# Patient Record
Sex: MALE | Race: WHITE | Employment: OTHER | URBAN - METROPOLITAN AREA
[De-identification: names, ages, dates, MRNs, and addresses within clinical notes are randomized per-mention and may not be internally consistent; named-entity substitution may affect disease eponyms.]

---

## 2017-01-03 ENCOUNTER — OFFICE VISIT (OUTPATIENT)
Dept: FAMILY MEDICINE CLINIC | Facility: CLINIC | Age: 62
End: 2017-01-03

## 2017-01-03 VITALS
BODY MASS INDEX: 29.35 KG/M2 | WEIGHT: 205 LBS | TEMPERATURE: 98 F | SYSTOLIC BLOOD PRESSURE: 120 MMHG | RESPIRATION RATE: 18 BRPM | HEIGHT: 70 IN | OXYGEN SATURATION: 98 % | DIASTOLIC BLOOD PRESSURE: 82 MMHG | HEART RATE: 78 BPM

## 2017-01-03 DIAGNOSIS — Z48.02 VISIT FOR SUTURE REMOVAL: Primary | ICD-10-CM

## 2017-01-03 NOTE — PROGRESS NOTES
Patient returns today to remove the remaining 9 sutures from his right lateral dorsal foot. He denies any fever, swelling, erythema, drainage, streaking redness or lymph swelling. PROCEDURE: suture removal.  LOCATION:right lateral dorsal foot.   WOUND EX

## 2017-01-03 NOTE — PATIENT INSTRUCTIONS
Suture or Staple Removal  You were seen today for a suture or staple removal. Your wound is healing as expected. The wound has healed well enough that the sutures or staples can be removed. The wound will continue to heal for the next few months.   At [facility]

## 2019-11-13 ENCOUNTER — ORDER TRANSCRIPTION (OUTPATIENT)
Dept: ADMINISTRATIVE | Facility: HOSPITAL | Age: 64
End: 2019-11-13

## 2019-11-13 DIAGNOSIS — G56.82 SUPRASCAPULAR MONONEUROPATHY, LEFT: Primary | ICD-10-CM

## 2019-11-13 DIAGNOSIS — M62.81 MUSCLE WEAKNESS (GENERALIZED): ICD-10-CM

## 2019-11-29 ENCOUNTER — TELEPHONE (OUTPATIENT)
Dept: NEUROLOGY | Facility: CLINIC | Age: 64
End: 2019-11-29

## 2019-11-29 NOTE — TELEPHONE ENCOUNTER
LMTCB to r/s EMG from Dr. Cecy Meza on 12/12/19 to Dr. Miguel Lucas in [facility].     Left arm - schedule 40 minutes

## 2019-12-02 ENCOUNTER — OFFICE VISIT (OUTPATIENT)
Dept: NEUROLOGY | Facility: CLINIC | Age: 64
End: 2019-12-02
Payer: MEDICARE

## 2019-12-02 VITALS
HEIGHT: 70 IN | WEIGHT: 205 LBS | SYSTOLIC BLOOD PRESSURE: 126 MMHG | DIASTOLIC BLOOD PRESSURE: 78 MMHG | HEART RATE: 74 BPM | BODY MASS INDEX: 29.35 KG/M2 | RESPIRATION RATE: 16 BRPM

## 2019-12-02 DIAGNOSIS — G54.0 BRACHIAL PLEXUS DYSFUNCTION: Primary | ICD-10-CM

## 2019-12-02 DIAGNOSIS — M54.12 CERVICAL RADICULOPATHY AT C8: ICD-10-CM

## 2019-12-02 PROCEDURE — 99215 OFFICE O/P EST HI 40 MIN: CPT | Performed by: OTHER

## 2019-12-02 RX ORDER — IPRATROPIUM BROMIDE 42 UG/1
2 SPRAY, METERED NASAL 4 TIMES DAILY
COMMUNITY

## 2019-12-02 NOTE — PROGRESS NOTES
[facility] with [address] [place] [address]  9/25/1955  Primary Care Provider:  Pete ePoples    12/2/2019  Accompanied visit:      (x) No.        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

## 2019-12-03 ENCOUNTER — APPOINTMENT (OUTPATIENT)
Dept: LAB | Age: 64
End: 2019-12-03
Attending: Other
Payer: MEDICARE

## 2019-12-03 DIAGNOSIS — G54.0 BRACHIAL PLEXUS DYSFUNCTION: ICD-10-CM

## 2019-12-03 PROCEDURE — 86140 C-REACTIVE PROTEIN: CPT

## 2019-12-03 PROCEDURE — 82550 ASSAY OF CK (CPK): CPT

## 2019-12-03 PROCEDURE — 86431 RHEUMATOID FACTOR QUANT: CPT

## 2019-12-03 PROCEDURE — 86038 ANTINUCLEAR ANTIBODIES: CPT

## 2019-12-03 PROCEDURE — 85652 RBC SED RATE AUTOMATED: CPT

## 2019-12-03 PROCEDURE — 36415 COLL VENOUS BLD VENIPUNCTURE: CPT

## 2019-12-06 ENCOUNTER — PROCEDURE VISIT (OUTPATIENT)
Dept: NEUROLOGY | Facility: CLINIC | Age: 64
End: 2019-12-06
Payer: MEDICARE

## 2019-12-06 DIAGNOSIS — R29.898 WEAKNESS OF BOTH HANDS: ICD-10-CM

## 2019-12-06 DIAGNOSIS — G54.0 BRACHIAL PLEXUS DISORDERS: Primary | ICD-10-CM

## 2019-12-06 PROCEDURE — 95885 MUSC TST DONE W/NERV TST LIM: CPT | Performed by: OTHER

## 2019-12-06 PROCEDURE — 95912 NRV CNDJ TEST 11-12 STUDIES: CPT | Performed by: OTHER

## 2019-12-09 NOTE — PROCEDURES
[facility] with [address]  [phone]    Fax  [phone]    Electrophysiologic Consultation      Patient: Severa Ahle      12/6/2019 Ankle AH 4.55 3.2 100 Ankle - AH 8       Knee AH 13.45 1.8 55.8 Knee - Ankle 41 46.1                 Sensory NCS      Nerve / Sites Rec.  Site Onset Lat Peak Lat NP Amp PP Amp Segments Distance Velocity     ms ms µV µV  cm m/s   R SURAL - Ortho      Lat

## 2019-12-13 ENCOUNTER — OFFICE VISIT (OUTPATIENT)
Dept: NEUROLOGY | Facility: CLINIC | Age: 64
End: 2019-12-13
Payer: MEDICARE

## 2019-12-13 VITALS — HEIGHT: 70 IN | RESPIRATION RATE: 16 BRPM | BODY MASS INDEX: 29.35 KG/M2 | WEIGHT: 205 LBS

## 2019-12-13 DIAGNOSIS — G54.0 BRACHIAL PLEXUS DISORDERS: ICD-10-CM

## 2019-12-13 DIAGNOSIS — M79.18 MYOFASCIAL MUSCLE PAIN: Primary | ICD-10-CM

## 2019-12-13 PROCEDURE — 20553 NJX 1/MLT TRIGGER POINTS 3/>: CPT | Performed by: OTHER

## 2019-12-13 RX ORDER — BUPIVACAINE HYDROCHLORIDE 2.5 MG/ML
8 INJECTION, SOLUTION EPIDURAL; INFILTRATION; INTRACAUDAL ONCE
Status: COMPLETED | OUTPATIENT
Start: 2019-12-13 | End: 2019-12-13

## 2019-12-13 RX ORDER — TRIAMCINOLONE ACETONIDE 40 MG/ML
40 INJECTION, SUSPENSION INTRA-ARTICULAR; INTRAMUSCULAR ONCE
Status: COMPLETED | OUTPATIENT
Start: 2019-12-13 | End: 2019-12-13

## 2019-12-13 RX ORDER — ESZOPICLONE 2 MG/1
2 TABLET, FILM COATED ORAL NIGHTLY
Qty: 30 TABLET | Refills: 0 | Status: SHIPPED | OUTPATIENT
Start: 2019-12-13

## 2019-12-14 NOTE — PROGRESS NOTES
[facility]  12/14/2019      Procedure:  Trigger Point Injections  NOTES:   Left upper trapezius, left levator scapula and left rhomboid      Resp 16   Ht 70\"   Wt 205 lb (93 kg)   BMI 29.41 kg/m²   PROCEDURE NOTES: The patient was posi

## 2019-12-16 ENCOUNTER — TELEPHONE (OUTPATIENT)
Dept: NEUROLOGY | Facility: CLINIC | Age: 64
End: 2019-12-16

## 2019-12-16 NOTE — TELEPHONE ENCOUNTER
Fax received from [facility] for a Drug Change Request.      Eszopiclone is not covered by the insurance, looking to substitute Zolpidem. Can we substitute this drug?

## 2019-12-23 DIAGNOSIS — G54.0 BRACHIAL PLEXUS DYSFUNCTION: ICD-10-CM

## 2019-12-23 DIAGNOSIS — M79.18 MYOFASCIAL MUSCLE PAIN: Primary | ICD-10-CM

## 2019-12-23 RX ORDER — ZOLPIDEM TARTRATE 10 MG/1
10 TABLET ORAL NIGHTLY PRN
Qty: 30 TABLET | Refills: 1 | Status: SHIPPED
Start: 2019-12-23

## 2019-12-23 NOTE — TELEPHONE ENCOUNTER
Talked with Dr Sondra Morales, he said Zolpidem 10 mg at night is okay. Script faxed to the pharmacy. Confirmation received.

## 2019-12-23 NOTE — TELEPHONE ENCOUNTER
Received fax from [address] to replace Eszopiclone 2 mg tablets is not covered by patient benefit plan. We can choose an alternative zolpidem tablet. According to Dr Louise Nicole, its okay to give Zolpidem 10 mg tab once a day by mouth.       Medic

## 2019-12-24 NOTE — TELEPHONE ENCOUNTER
Received fax regarding Zolpidem prescription. Prior authorization required. Case initiated through [facility]. Case Key: [id number]  Pending.

## 2019-12-26 ENCOUNTER — TELEPHONE (OUTPATIENT)
Dept: NEUROLOGY | Facility: CLINIC | Age: 64
End: 2019-12-26

## 2019-12-26 NOTE — TELEPHONE ENCOUNTER
RN left message for pt informing him we sent a prescription for Zolpiden Tartrate 10mg. Informed pt to call back with any questions or concerns.

## 2019-12-26 NOTE — TELEPHONE ENCOUNTER
Pt's ins doesn't cover Pradeep Pratt, so he is asking for an alternative.   Pt would like something that does not have bad side effects like the side effects he had when taking diazepam.

## 2020-01-03 ENCOUNTER — OFFICE VISIT (OUTPATIENT)
Dept: NEUROLOGY | Facility: CLINIC | Age: 65
End: 2020-01-03
Payer: MEDICARE

## 2020-01-03 VITALS
HEIGHT: 70 IN | HEART RATE: 88 BPM | BODY MASS INDEX: 30.21 KG/M2 | SYSTOLIC BLOOD PRESSURE: 130 MMHG | DIASTOLIC BLOOD PRESSURE: 84 MMHG | WEIGHT: 211 LBS | OXYGEN SATURATION: 98 % | RESPIRATION RATE: 14 BRPM

## 2020-01-03 DIAGNOSIS — M79.18 MYOFASCIAL MUSCLE PAIN: Primary | ICD-10-CM

## 2020-01-03 PROCEDURE — 20552 NJX 1/MLT TRIGGER POINT 1/2: CPT | Performed by: OTHER

## 2020-01-03 RX ORDER — TRIAMCINOLONE ACETONIDE 40 MG/ML
40 INJECTION, SUSPENSION INTRA-ARTICULAR; INTRAMUSCULAR ONCE
Status: SHIPPED | OUTPATIENT
Start: 2020-01-03

## 2020-01-03 RX ORDER — BUPIVACAINE HYDROCHLORIDE 2.5 MG/ML
5 INJECTION, SOLUTION EPIDURAL; INFILTRATION; INTRACAUDAL ONCE
Status: SHIPPED | OUTPATIENT
Start: 2020-01-03

## 2020-01-03 NOTE — PROCEDURES
Yoly  1/3/2020      Procedure:  Trigger Point Injections  NOTES:   Left upper traps and interscapular area    /84 (BP Location: Left arm, Patient Position: Sitting, Cuff Size: adult)   Pulse 88   Resp 14   Ht 70\"   Wt 211 l

## 2020-01-07 RX ORDER — BUPIVACAINE HYDROCHLORIDE 2.5 MG/ML
5 INJECTION, SOLUTION EPIDURAL; INFILTRATION; INTRACAUDAL ONCE
Status: COMPLETED | OUTPATIENT
Start: 2020-01-07 | End: 2020-01-07

## 2020-01-07 RX ORDER — TRIAMCINOLONE ACETONIDE 40 MG/ML
40 INJECTION, SUSPENSION INTRA-ARTICULAR; INTRAMUSCULAR ONCE
Status: COMPLETED | OUTPATIENT
Start: 2020-01-07 | End: 2020-01-07

## 2020-01-07 RX ADMIN — TRIAMCINOLONE ACETONIDE 40 MG: 40 INJECTION, SUSPENSION INTRA-ARTICULAR; INTRAMUSCULAR at 14:11:00

## 2020-01-07 RX ADMIN — BUPIVACAINE HYDROCHLORIDE 5 ML: 2.5 INJECTION, SOLUTION EPIDURAL; INFILTRATION; INTRACAUDAL at 14:10:00

## 2021-06-09 ENCOUNTER — ORDER TRANSCRIPTION (OUTPATIENT)
Dept: ADMINISTRATIVE | Facility: HOSPITAL | Age: 66
End: 2021-06-09

## 2021-06-09 DIAGNOSIS — Z01.818 PREOP EXAMINATION: Primary | ICD-10-CM

## 2021-06-14 ENCOUNTER — LAB ENCOUNTER (OUTPATIENT)
Dept: LAB | Facility: HOSPITAL | Age: 66
End: 2021-06-14
Attending: OTOLARYNGOLOGY
Payer: MEDICARE

## 2021-06-14 DIAGNOSIS — Z01.818 PREOP EXAMINATION: ICD-10-CM

## 2021-06-17 ENCOUNTER — HOSPITAL ENCOUNTER (OUTPATIENT)
Dept: ULTRASOUND IMAGING | Facility: HOSPITAL | Age: 66
Discharge: HOME | End: 2021-06-17
Attending: OTOLARYNGOLOGY
Payer: MEDICARE

## 2021-06-17 DIAGNOSIS — E04.1 THYROID NODULE GREATER THAN OR EQUAL TO 1.5 CM IN DIAMETER INCIDENTALLY NOTED ON IMAGING STUDY: ICD-10-CM

## 2021-06-17 PROCEDURE — 88173 CYTOPATH EVAL FNA REPORT: CPT | Performed by: OTOLARYNGOLOGY

## 2021-06-17 PROCEDURE — 10005 FNA BX W/US GDN 1ST LES: CPT | Performed by: OTOLARYNGOLOGY

## 2022-09-07 ENCOUNTER — TELEPHONE (OUTPATIENT)
Facility: LOCATION | Age: 67
End: 2022-09-07

## 2022-09-07 DIAGNOSIS — E04.2 MULTINODULAR GOITER: Primary | ICD-10-CM

## 2022-09-07 NOTE — TELEPHONE ENCOUNTER
Thyroid ultrasound order at [facility]. Order pended and chart requested. Order needs to be faxed over to [phone].

## 2022-09-14 ENCOUNTER — DOCUMENTATION ONLY (OUTPATIENT)
Facility: LOCATION | Age: 67
End: 2022-09-14

## 2022-09-14 NOTE — PROGRESS NOTES
I reviewed thyroid ultrasound from [address] [place] [address]. This shows stable thyroid nodules the largest being 4.7 cm. This nodule was biopsied last year negative for malignancy. I will speak with the patient and recommend a repeat thyroid ultrasound in 1 year.

## 2023-04-20 DIAGNOSIS — E04.2 MULTINODULAR GOITER: Primary | ICD-10-CM

## 2023-07-07 ENCOUNTER — TELEPHONE (OUTPATIENT)
Facility: LOCATION | Age: 68
End: 2023-07-07

## 2023-07-07 NOTE — TELEPHONE ENCOUNTER
Pt called again inquiring if documents were faxed as requested. Done, waiting for fax confirmations.  Mp

## 2023-07-11 ENCOUNTER — TELEPHONE (OUTPATIENT)
Facility: LOCATION | Age: 68
End: 2023-07-11

## 2023-07-12 DIAGNOSIS — E04.2 MULTINODULAR THYROID: Primary | ICD-10-CM

## 2023-07-20 ENCOUNTER — TELEPHONE (OUTPATIENT)
Facility: LOCATION | Age: 68
End: 2023-07-20

## 2023-07-24 ENCOUNTER — HOSPITAL ENCOUNTER (OUTPATIENT)
Dept: ULTRASOUND IMAGING | Facility: HOSPITAL | Age: 68
Discharge: HOME | End: 2023-07-24
Attending: OTOLARYNGOLOGY
Payer: MEDICARE

## 2023-07-24 DIAGNOSIS — E04.2 MULTINODULAR THYROID: ICD-10-CM

## 2023-07-24 PROCEDURE — 10005 FNA BX W/US GDN 1ST LES: CPT | Performed by: OTOLARYNGOLOGY

## 2023-07-24 PROCEDURE — 88173 CYTOPATH EVAL FNA REPORT: CPT | Performed by: OTOLARYNGOLOGY

## 2024-03-19 PROBLEM — D03.59 MELANOMA IN SITU OF BACK (HCC): Status: ACTIVE | Noted: 2021-11-05

## 2024-03-19 PROBLEM — E04.2 MULTIPLE THYROID NODULES: Status: ACTIVE | Noted: 2023-07-11

## 2024-05-20 PROBLEM — R11.0 NAUSEA: Status: ACTIVE | Noted: 2024-05-20

## 2024-08-22 ENCOUNTER — OFFICE VISIT (OUTPATIENT)
Facility: LOCATION | Age: 69
End: 2024-08-22
Payer: MEDICARE

## 2024-08-22 DIAGNOSIS — E04.2 MULTINODULAR GOITER: Primary | ICD-10-CM

## 2024-08-22 DIAGNOSIS — R68.2 DRY MOUTH: ICD-10-CM

## 2024-08-22 PROCEDURE — 99213 OFFICE O/P EST LOW 20 MIN: CPT | Performed by: OTOLARYNGOLOGY

## 2024-08-22 RX ORDER — PILOCARPINE HYDROCHLORIDE 5 MG/1
5 TABLET, FILM COATED ORAL 3 TIMES DAILY
Qty: 90 TABLET | Refills: 3 | Status: SHIPPED | OUTPATIENT
Start: 2024-08-22

## 2024-08-22 NOTE — PROGRESS NOTES
Uriah Lester is a 68 year old male. No chief complaint on file.    HPI:   He has a large thyroid nodule.  This is been biopsied in the past and negative for malignancy.  He is also seeing doctors at [facility].  Tends to get a chronic dry mouth especially on the left-hand side.  There is no pain associated.    REVIEW OF SYSTEMS:   GENERAL HEALTH: feels well otherwise  GENERAL : denies fever, chills, sweats, weight loss, weight gain  SKIN: denies any unusual skin lesions or rashes  RESPIRATORY: denies shortness of breath with exertion  NEURO: denies headaches    EXAM:   There were no vitals taken for this visit.    System Findings Details   Constitutional  Overall appearance - Normal.   Psychiatric  Orientation - Oriented to time, place, person & situation. Appropriate mood and affect.   Head/Face  Facial features -- Normal. Skull - Normal.   Eyes  Pupils equal ,round ,react to light and accomidate   Ears, Nose, Throat, Neck  Ears clear nose clear cavity clear oropharynx clear neck is supple without masses bimanual palpation especially of the left submandibular area is negative.   Neurological  Memory - Normal. Cranial nerves - Cranial nerves II through XII grossly intact.   Lymph Detail  Submental. Submandibular. Anterior cervical. Posterior cervical. Supraclavicular.       ASSESSMENT AND PLAN:   1. Multinodular goiter  He does have a large nodule which has been biopsied and negative.  He will undergo a repeat thyroid ultrasound next year.    2. Dry mouth  He had a CT of his neck which was negative in 2021.  He has tried Oumar CAC in the past without relief.  I will now try him on Salagen.  He is also to get some blood work soon regarding the possibility of autoimmune Sjogren's cause.      The patient indicates understanding of these issues and agrees to the plan.    No follow-ups on file.    Hernando Vanegas MD  8/22/2024  5:58 PM

## 2025-03-13 DIAGNOSIS — E04.2 MULTINODULAR THYROID: Primary | ICD-10-CM

## 2025-03-19 ENCOUNTER — OFFICE VISIT (OUTPATIENT)
Dept: PODIATRY CLINIC | Facility: CLINIC | Age: 70
End: 2025-03-19

## 2025-03-19 DIAGNOSIS — M79.672 INFLAMMATORY HEEL PAIN, LEFT: ICD-10-CM

## 2025-03-19 DIAGNOSIS — M72.2 PLANTAR FASCIITIS OF LEFT FOOT: Primary | ICD-10-CM

## 2025-03-19 DIAGNOSIS — B35.1 ONYCHOMYCOSIS: ICD-10-CM

## 2025-03-19 PROCEDURE — 99213 OFFICE O/P EST LOW 20 MIN: CPT | Performed by: PODIATRIST

## 2025-03-19 RX ORDER — TRIAMCINOLONE ACETONIDE 40 MG/ML
20 INJECTION, SUSPENSION INTRA-ARTICULAR; INTRAMUSCULAR ONCE
Status: SHIPPED | OUTPATIENT
Start: 2025-03-19

## 2025-03-19 NOTE — PROGRESS NOTES
Uriah Lester is a 69 year old male. No chief complaint on file.        HPI:   Patient presents to the clinic with a chief complaint of a painful left heel also gets a little arch pain and has fungal toenails.  At today's visit reviewed nurse's history as taken above, allergies medications and medical history as documented below.  All changes duly noted  Allergies: Patient has no known allergies.   Current Outpatient Medications   Medication Sig Dispense Refill    pilocarpine 5 MG Oral Tab Take 1 tablet (5 mg total) by mouth 3 (three) times daily. 90 tablet 3    montelukast 10 MG Oral Tab Take 1 tablet (10 mg total) by mouth every evening.      ADVAIR DISKUS 250-50 MCG/DOSE Inhalation Aerosol Powder, Breath Activated Inhale 1 puff into the lungs 2 (two) times daily.  3      Past Medical History:    Blood in the stool    BPH    Flatulence/gas pain/belching    Frequent urination    HEMORRHOIDS    Hemorrhoids    Irregular bowel habits    Nausea    OSTEOARTHRITIS    Prostatism    Rhinitis    Shingles      Past Surgical History:   Procedure Laterality Date    Colonoscopy  1/26/06    MILD HEMORRHOID    Other surgical history  NOT surgical    RIGHT TIBIA FX      Family History   Problem Relation Age of Onset    Stroke Father     Other (Other) Father         AFIB    Lung Disorder Mother         COPD    Cancer Mother         LUNG    Heart Attack Brother 49    Alcohol and Other Disorders Associated Brother     Other (Other) Brother         EPILEPSY      Social History     Socioeconomic History    Marital status:    Tobacco Use    Smoking status: Never    Smokeless tobacco: Never   Substance and Sexual Activity    Alcohol use: Yes     Alcohol/week: 3.0 standard drinks of alcohol     Types: 3 Cans of beer per week    Drug use: Not Currently   Other Topics Concern    Caffeine Concern Yes     Comment: 2 cups a day    Exercise Yes     Comment: daily           REVIEW OF SYSTEMS:   Today reviewed systens as documented  below  GENERAL HEALTH: feels well otherwise  SKIN: Refer to exam below  RESPIRATORY: denies shortness of breath with exertion  CARDIOVASCULAR: denies chest pain on exertion  GI: denies abdominal pain and denies heartburn  NEURO: denies headaches    EXAM:   There were no vitals taken for this visit.  GENERAL: well developed, well nourished, in no apparent distress  EXTREMITIES:   1. Integument: The skin on his left foot was evaluated his first third and fourth toes have thickening clippings were taken for fungal culture.   2. Vascular: Patient has palpable dorsalis pedis posterior tibial pulses on the left   3. Neurologic: Patient has intact sensorium   4. Musculoskeletal: Patient has pain in the lateral plantar attachment of the plantar fascia on his left heel.    ASSESSMENT AND PLAN:   Diagnoses and all orders for this visit:    Plantar fasciitis of left foot  -     triamcinolone acetonide (Kenalog-40) 40 MG/ML injection 20 mg    Onychomycosis  -     Fungus Hair, Skin, Nail Culture (Dermatophyte); Future    Inflammatory heel pain, left  -     triamcinolone acetonide (Kenalog-40) 40 MG/ML injection 20 mg        Plan: At today's visit dispensed stretching exercises advised a couple of times a day along with supportive shoes with an arch support.  He under.Today discussed the nature and extent of a cortisone injection as well as the possible complications and risks.  Patient was in agreement to receive the injection.  The patient was consented for a cortisone injection and after timeout was taken the injection consisting of 40 mg Kenalog and 1.0 cc of 0.5% Marcaine plain was injected into the symptomatic plantar fascia laterally of the left foot using aseptic technique and appropriate approach.  A Band-Aid was applied to the injection site.   I discussed the events with him including oral versus topical using Jublia but when he switched to Medicare it would cost him approximately $600 a month to do.  So he  discontinued.  Advised him that the oral therapy is the most effective along with blood testing for liver enzymes.  He understood we will await the culture results.  The patient indicates understanding of these issues and agrees to the plan.    Darrian Marrero DPM

## 2025-03-28 ENCOUNTER — TELEPHONE (OUTPATIENT)
Facility: LOCATION | Age: 70
End: 2025-03-28

## 2025-03-28 NOTE — TELEPHONE ENCOUNTER
[facility] calling for an order for Ultrasound of head and neck, soft tissue to be faxed over.     Fax: [phone]

## 2025-04-04 ENCOUNTER — MED REC SCAN ONLY (OUTPATIENT)
Facility: LOCATION | Age: 70
End: 2025-04-04

## 2025-04-08 ENCOUNTER — TELEPHONE (OUTPATIENT)
Dept: ORTHOPEDICS CLINIC | Facility: CLINIC | Age: 70
End: 2025-04-08

## 2025-04-08 NOTE — TELEPHONE ENCOUNTER
Attempted to call pt with culture results from Dr Marrero, no answer/busy. MyChart not active since 3/2024.    ----- Message from Darrian Marrero sent at 3/31/2025  2:33 PM CDT -----  Results reviewed.  Please inform patient that fungal culture results are positive and we should proceed with Lamisil tablet therapy.  If the patient agrees we have to do a hepatic function panel, please place that order for me with a diagnosis of onychomycosis.

## 2025-04-09 ENCOUNTER — TELEPHONE (OUTPATIENT)
Dept: ORTHOPEDICS CLINIC | Facility: CLINIC | Age: 70
End: 2025-04-09

## 2025-04-09 NOTE — TELEPHONE ENCOUNTER
Spoke with pt.  Pt name and  verified.  Per Dr Marrero, pt informed fungal culture results as follows, \"fungal culture results are positive and we should proceed with Lamisil tablet therapy.\"  Pt asking if Dr Marrero will accept CMP from 10/24/24.  Pt informed will check with Dr Marrero and call him back.  Pt wants to check with PCP prior to proceeding with oral Lamisil.  Pt verbalized understanding.    ----- Message from Darrian Marrero sent at 3/31/2025  2:33 PM CDT -----  Results reviewed.  Please inform patient that fungal culture results are positive and we should proceed with Lamisil tablet therapy.  If the patient agrees we have to do a hepatic function panel, please place that order for me with a diagnosis of onychomycosis.

## 2025-04-21 ENCOUNTER — TELEPHONE (OUTPATIENT)
Facility: LOCATION | Age: 70
End: 2025-04-21

## 2025-05-19 RX ORDER — PILOCARPINE HYDROCHLORIDE 5 MG/1
5 TABLET, FILM COATED ORAL 3 TIMES DAILY
Qty: 90 TABLET | Refills: 3 | Status: SHIPPED | OUTPATIENT
Start: 2025-05-19

## 2025-05-19 NOTE — TELEPHONE ENCOUNTER
Requested Prescriptions     Pending Prescriptions Disp Refills    PILOCARPINE 5 MG Oral Tab [Pharmacy Med Name: PILOCARPINE 5MG TABLETS] 90 tablet 3     Sig: TAKE 1 TABLET BY MOUTH THREE TIMES DAILY       FILLED- 8/22/24  LOV- 8/22/24    No future appointments.